# Patient Record
(demographics unavailable — no encounter records)

---

## 2019-01-15 NOTE — HP
PRESENT ILLNESS:  The patient is a 35-year-old right-hand dominant male, who has a

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

unresponsive to conservative treatment including rest, restriction of activities,

anti-inflammatory medications, and several injections which have only provided

temporary relief.  Pain is now interfering with day-to-day activities including

eating, getting dressed and working. 



PAST HISTORY:  The patient is otherwise in good health.  He has recently had a

kidney stone due to unknown causes, which had resolved.  He has taken meloxicam and

Tylenol No.3 for pain, for his current symptoms.  He is otherwise in good health and

normally takes no routine medications and has no known allergies. 



FAMILY HISTORY:  Otherwise unremarkable.



SOCIAL HISTORY:  Otherwise unremarkable.



REVIEW OF SYSTEMS:  Otherwise unremarkable.



PHYSICAL EXAMINATION:

GENERAL:  Reveals a healthy male. 

HEENT:  Unremarkable. 

NECK:  Supple. 

CHEST:  Clear. 

HEART:  Regular and rhythm. 

ABDOMEN:  Soft, nontender. 

RECTAL:  Deferred. 

GENITAL:  Deferred. 

EXTREMITIES:  Pertinent findings were related to the right elbow.  There is no

swelling.  There is tenderness over the lateral epicondyle and common extensor

origin.  Range of motion is 5 to 120 degrees.  There is pain with motion beyond

these limits.  There is no instability.  There is pain with extension of the wrist

against resistance. 

NEUROVASCULAR:  Intact.



DIAGNOSTIC STUDIES:  X-rays of the elbow are normal.



IMPRESSION:  Chronic lateral epicondylitis, right elbow.



PLAN:  Lateral release with partial lateral epicondylectomy.  The nature of the

surgery, length, recovery, and potential complications such as infection, loss of

motion, incomplete relief, nerve injury, recurrence, and need for additional

treatment __________ had been discussed in detail. 







Job ID:  [id number]

## 2019-01-16 NOTE — OP
DATE OF PROCEDURE:  01/16/2019



ANESTHESIA:  General.



PREOPERATIVE DIAGNOSIS:  Chronic lateral epicondylitis, right elbow.



POSTOPERATIVE DIAGNOSIS:  Chronic lateral epicondylitis, right elbow.



PROCEDURES PERFORMED:  Lateral epicondylectomy and lateral release, right elbow.



DESCRIPTION OF PROCEDURE:  After satisfactory anesthesia was induced in the supine

position, the patient was prepped and draped in the routine manner.  The right arm

was elevated, exsanguinated with an Esmarch bandage and the tourniquet was inflated

to 250 mmHg.  Lateral curvilinear incision was made centered over the lateral

epicondyle, carried down through the subcutaneous tissues, bleeding points were

controlled with Bovie cautery.  Deep fascia was incised in-line with the skin

incision.  The common extensor origin was approximately detached from the lateral

epicondyle and there was abundant inflammation, chronic inflammatory process,

especially in the area of the extensor carpi radialis brevis.  The attachment was

detached and portion of the chronic inflammatory tissue was excised and the

remainder of the tendon lateral retracted distally.  Dissection was carried down to

open the joint.  The articular surfaces were normal.  There was a moderate amount of

abundant so-called synovial fringe.  This appeared to be more prominent than normal.

 I could not totally tell this was truly pathologic or if I did excise the synovium.

 The articular surfaces were normal.  The lateral epicondyle was then excised,

flushed with the shaft of the humerus with a small osteotome and rongeur and

smoothed with a rasp.  The wound was copiously irrigated with normal saline.  The

joint and subcutaneous tissues and skin were infiltrated with a total of 30 mL of

0.5% Marcaine with epinephrine.  The joint capsule was closed with interrupted #1

Vicryl.  The common extensor origin was left attached to the lateral retracted

distally.  The fascia was closed with interrupted #1 Vicryl.  Subcutaneous tissue

was closed with interrupted 2-0 Vicryl and the skin closed with a running

subcuticular 3-0 V-Loc and SurgiSeal skin adhesive.  Sterile bulky compressive

dressing was applied.  The tourniquet deflated after approximately 34 minutes.  Hand

promptly pinked up and the patient was immobilized in a long-arm plaster splint and

arm sling.  He was awakened, taken to the recovery room in stable condition.  There

were no apparent intraoperative complications.  Estimated blood loss was negligible. 



The patient will be discharged home in satisfactory condition to use ice and

elevation, given written cast care instructions.  He is given a prescription for

Tylenol No. 4 for pain, 60 tablets.  He will be rechecked in my office in 7 to 10

days or sooner if there are any problems prior to that time. 







Job ID:  [id number]